# Patient Record
Sex: MALE | Race: ASIAN
[De-identification: names, ages, dates, MRNs, and addresses within clinical notes are randomized per-mention and may not be internally consistent; named-entity substitution may affect disease eponyms.]

---

## 2021-02-17 ENCOUNTER — HOSPITAL ENCOUNTER (INPATIENT)
Dept: HOSPITAL 27 - B2S | Age: 26
LOS: 4 days | Discharge: HOME | DRG: 885 | End: 2021-02-21
Attending: PSYCHIATRY & NEUROLOGY | Admitting: PSYCHIATRY & NEUROLOGY
Payer: COMMERCIAL

## 2021-02-17 VITALS — BODY MASS INDEX: 18.88 KG/M2 | HEIGHT: 66 IN | WEIGHT: 117.51 LBS

## 2021-02-17 VITALS — DIASTOLIC BLOOD PRESSURE: 65 MMHG | SYSTOLIC BLOOD PRESSURE: 111 MMHG
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
## 2021-02-17 DIAGNOSIS — F41.9: ICD-10-CM

## 2021-02-17 DIAGNOSIS — F31.9: Primary | ICD-10-CM

## 2021-02-17 DIAGNOSIS — Z28.21: ICD-10-CM

## 2021-02-17 DIAGNOSIS — G47.00: ICD-10-CM

## 2021-02-17 DIAGNOSIS — K59.00: ICD-10-CM

## 2021-02-17 DIAGNOSIS — T50.992A: ICD-10-CM

## 2021-02-17 PROCEDURE — 80307 DRUG TEST PRSMV CHEM ANLYZR: CPT

## 2021-02-17 PROCEDURE — 83036 HEMOGLOBIN GLYCOSYLATED A1C: CPT

## 2021-02-17 PROCEDURE — 84439 ASSAY OF FREE THYROXINE: CPT

## 2021-02-17 PROCEDURE — 84443 ASSAY THYROID STIM HORMONE: CPT

## 2021-02-17 PROCEDURE — 87081 CULTURE SCREEN ONLY: CPT

## 2021-02-18 VITALS — SYSTOLIC BLOOD PRESSURE: 124 MMHG | DIASTOLIC BLOOD PRESSURE: 68 MMHG

## 2021-02-18 VITALS — DIASTOLIC BLOOD PRESSURE: 72 MMHG | SYSTOLIC BLOOD PRESSURE: 124 MMHG

## 2021-02-18 VITALS — SYSTOLIC BLOOD PRESSURE: 124 MMHG | DIASTOLIC BLOOD PRESSURE: 72 MMHG

## 2021-02-18 VITALS — SYSTOLIC BLOOD PRESSURE: 107 MMHG | DIASTOLIC BLOOD PRESSURE: 64 MMHG

## 2021-02-18 LAB
ALBUMIN SERPL-MCNC: 4.3 G/DL (ref 3.4–5)
ALP SERPL-CCNC: 62 U/L (ref 46–116)
ALT SERPL-CCNC: 62 U/L (ref 12–78)
AMPHETAMINES UR QL SCN: NEGATIVE
ANION GAP SERPL CALCULATED.3IONS-SCNC: 7 MMOL/L (ref 8–16)
APPEARANCE UR: CLEAR
AST SERPL-CCNC: 21 U/L (ref 15–37)
BARBITURATES UR QL SCN: NEGATIVE
BASOPHILS # BLD AUTO: 0 K/UL (ref 0–0.2)
BASOPHILS NFR BLD AUTO: 0.7 % (ref 0–2)
BENZODIAZ UR QL SCN: NEGATIVE
BILIRUB SERPL-MCNC: 0.5 MG/DL (ref 0.1–1)
BILIRUB UR QL STRIP: NEGATIVE
BUN SERPL-MCNC: 17 MG/DL (ref 7–18)
BZE UR QL SCN: NEGATIVE
CALCIUM SERPL-MCNC: 10 MG/DL (ref 8.8–10.5)
CANNABINOIDS UR QL SCN: NEGATIVE
CHLORIDE SERPL-SCNC: 99 MMOL/L (ref 98–107)
CHOLEST SERPL-MCNC: 198 MG/DL (ref 131–200)
CHOLEST/HDLC SERPL: 6.2 {RATIO} (ref 4.2–7.3)
CO2 SERPL-SCNC: 31 MMOL/L (ref 22–29)
CREAT SERPL-MCNC: 0.95 MG/DL (ref 0.6–1.3)
EOSINOPHIL # BLD AUTO: 0.1 K/UL (ref 0–0.7)
EOSINOPHIL NFR BLD AUTO: 1.6 % (ref 1–6)
ERYTHROCYTE [DISTWIDTH] IN BLOOD BY AUTOMATED COUNT: 12.4 % (ref 11.5–14.5)
GFR SERPL CREATININE-BSD FRML MDRD: > 60 ML/MIN (ref 60–?)
GLUCOSE SERPL-MCNC: 81 MG/DL (ref 70–110)
GLUCOSE UR STRIP-MCNC: NEGATIVE MG/DL
HBA1C MFR BLD: 5.1 % (ref 3.8–5.6)
HCT VFR BLD AUTO: 43.1 % (ref 41–53)
HDLC SERPL-MCNC: 32 MG/DL (ref 40–60)
HGB BLD-MCNC: 14.7 G/DL (ref 13.5–17.5)
KETONES UR STRIP-MCNC: NEGATIVE MG/DL
LDLC SERPL CALC-MCNC: 150 MG/DL (ref 0–130)
LEUKOCYTE ESTERASE UR QL STRIP: NEGATIVE
LYMPHOCYTES # BLD AUTO: 1.1 K/UL (ref 1–4.8)
LYMPHOCYTES NFR BLD AUTO: 16.7 % (ref 22–44)
MCH RBC QN AUTO: 31.2 PG (ref 26–34)
MCHC RBC AUTO-ENTMCNC: 34.2 G/DL (ref 31–37)
MCV RBC AUTO: 91 FL (ref 80–100)
METHADONE UR QL SCN: NEGATIVE
MONOCYTES # BLD AUTO: 0.5 K/UL (ref 0.1–1)
MONOCYTES NFR BLD AUTO: 7.8 % (ref 2–9)
NEUTROPHILS # BLD AUTO: 4.7 K/UL (ref 1.8–7.7)
NEUTROPHILS NFR BLD AUTO: 73.2 % (ref 40–70)
NITRITE UR QL STRIP: NEGATIVE
OPIATES UR QL SCN: NEGATIVE
PCP UR QL SCN: NEGATIVE
PH UR STRIP: 6 [PH] (ref 5–8)
PLATELET # BLD AUTO: 363 K/UL (ref 150–450)
POTASSIUM SERPL-SCNC: 3.9 MMOL/L (ref 3.5–5.1)
PROT SERPL-MCNC: 8.3 G/DL (ref 6.4–8.2)
PROT UR QL STRIP: NEGATIVE
RBC # BLD AUTO: 4.73 MIL/UL (ref 4.5–5.9)
RBC UR QL: NEGATIVE
SODIUM SERPL-SCNC: 137 MMOL/L (ref 136–145)
SP GR UR STRIP: 1.02 (ref 1–1.02)
T4 FREE SERPL-MCNC: 1.21 NG/DL (ref 0.76–1.46)
TRIGL SERPL-MCNC: 81 MG/DL (ref 15–150)
TSH SERPL DL<=0.05 MIU/L-ACNC: 3.03 UIU/ML (ref 0.36–3.74)
UROBILINOGEN UR-MCNC: 0.2 MG/DL (ref ?–1)

## 2021-02-18 RX ADMIN — LITHIUM CARBONATE SCH MG: 300 CAPSULE ORAL at 16:24

## 2021-02-18 RX ADMIN — LITHIUM CARBONATE SCH MG: 300 CAPSULE ORAL at 09:35

## 2021-02-18 RX ADMIN — GABAPENTIN SCH MG: 100 CAPSULE ORAL at 20:17

## 2021-02-19 VITALS — DIASTOLIC BLOOD PRESSURE: 73 MMHG | SYSTOLIC BLOOD PRESSURE: 108 MMHG

## 2021-02-19 VITALS — DIASTOLIC BLOOD PRESSURE: 60 MMHG | SYSTOLIC BLOOD PRESSURE: 111 MMHG

## 2021-02-19 VITALS — SYSTOLIC BLOOD PRESSURE: 110 MMHG | DIASTOLIC BLOOD PRESSURE: 65 MMHG

## 2021-02-19 RX ADMIN — LITHIUM CARBONATE SCH MG: 300 CAPSULE ORAL at 08:18

## 2021-02-19 RX ADMIN — GABAPENTIN SCH MG: 100 CAPSULE ORAL at 12:25

## 2021-02-19 RX ADMIN — GABAPENTIN SCH MG: 100 CAPSULE ORAL at 16:50

## 2021-02-19 RX ADMIN — GABAPENTIN SCH MG: 100 CAPSULE ORAL at 08:18

## 2021-02-19 RX ADMIN — LITHIUM CARBONATE SCH MG: 300 CAPSULE ORAL at 16:50

## 2021-02-20 VITALS — DIASTOLIC BLOOD PRESSURE: 64 MMHG | SYSTOLIC BLOOD PRESSURE: 102 MMHG

## 2021-02-20 VITALS — DIASTOLIC BLOOD PRESSURE: 68 MMHG | SYSTOLIC BLOOD PRESSURE: 116 MMHG

## 2021-02-20 VITALS — DIASTOLIC BLOOD PRESSURE: 81 MMHG | SYSTOLIC BLOOD PRESSURE: 122 MMHG

## 2021-02-20 RX ADMIN — GABAPENTIN SCH MG: 100 CAPSULE ORAL at 16:13

## 2021-02-20 RX ADMIN — LITHIUM CARBONATE SCH MG: 300 CAPSULE ORAL at 08:13

## 2021-02-20 RX ADMIN — LITHIUM CARBONATE SCH MG: 300 CAPSULE ORAL at 16:13

## 2021-02-20 RX ADMIN — GABAPENTIN SCH MG: 100 CAPSULE ORAL at 08:13

## 2021-02-20 RX ADMIN — GABAPENTIN SCH MG: 100 CAPSULE ORAL at 12:21

## 2021-02-21 VITALS — SYSTOLIC BLOOD PRESSURE: 102 MMHG | DIASTOLIC BLOOD PRESSURE: 58 MMHG

## 2021-02-21 VITALS — DIASTOLIC BLOOD PRESSURE: 60 MMHG | SYSTOLIC BLOOD PRESSURE: 101 MMHG

## 2021-02-21 RX ADMIN — GABAPENTIN SCH MG: 100 CAPSULE ORAL at 08:08

## 2021-02-21 RX ADMIN — LITHIUM CARBONATE SCH MG: 300 CAPSULE ORAL at 08:08

## 2022-08-08 ENCOUNTER — APPOINTMENT (RX ONLY)
Dept: URBAN - METROPOLITAN AREA CLINIC 108 | Facility: CLINIC | Age: 27
Setting detail: DERMATOLOGY
End: 2022-08-08

## 2022-08-08 DIAGNOSIS — L73.0 ACNE KELOID: ICD-10-CM

## 2022-08-08 PROCEDURE — ? PRESCRIPTION MEDICATION MANAGEMENT

## 2022-08-08 PROCEDURE — ? INTRALESIONAL KENALOG

## 2022-08-08 PROCEDURE — ? PRESCRIPTION

## 2022-08-08 PROCEDURE — ? COUNSELING

## 2022-08-08 PROCEDURE — 11901 INJECT SKIN LESIONS >7: CPT

## 2022-08-08 PROCEDURE — ? FULL BODY SKIN EXAM - DECLINED

## 2022-08-08 RX ORDER — LIDOCAINE AND PRILOCAINE 25; 25 MG/G; MG/G
CREAM TOPICAL
Qty: 30 | Refills: 1 | Status: ERX | COMMUNITY
Start: 2022-08-08

## 2022-08-08 RX ORDER — CLINDAMYCIN PHOSPHATE 10 MG/ML
LOTION TOPICAL
Qty: 60 | Refills: 1 | Status: ERX | COMMUNITY
Start: 2022-08-08

## 2022-08-08 RX ADMIN — LIDOCAINE AND PRILOCAINE: 25; 25 CREAM TOPICAL at 00:00

## 2022-08-08 RX ADMIN — CLINDAMYCIN PHOSPHATE: 10 LOTION TOPICAL at 00:00

## 2022-08-08 ASSESSMENT — LOCATION DETAILED DESCRIPTION DERM
LOCATION DETAILED: LEFT INFERIOR OCCIPITAL SCALP
LOCATION DETAILED: MID-OCCIPITAL SCALP
LOCATION DETAILED: RIGHT OCCIPITAL SCALP
LOCATION DETAILED: LEFT OCCIPITAL SCALP
LOCATION DETAILED: RIGHT INFERIOR OCCIPITAL SCALP

## 2022-08-08 ASSESSMENT — LOCATION ZONE DERM: LOCATION ZONE: SCALP

## 2022-08-08 ASSESSMENT — LOCATION SIMPLE DESCRIPTION DERM: LOCATION SIMPLE: POSTERIOR SCALP

## 2022-08-08 NOTE — PROCEDURE: PRESCRIPTION MEDICATION MANAGEMENT
Render In Strict Bullet Format?: No
Initiate Treatment: Clindamycin lotion QAM \\nDuobrii QHS \\nKenalog injections q 4 weeks
Detail Level: Zone

## 2022-09-19 ENCOUNTER — APPOINTMENT (RX ONLY)
Dept: URBAN - METROPOLITAN AREA CLINIC 108 | Facility: CLINIC | Age: 27
Setting detail: DERMATOLOGY
End: 2022-09-19

## 2022-09-19 DIAGNOSIS — L73.0 ACNE KELOID: ICD-10-CM

## 2022-09-19 PROCEDURE — 11901 INJECT SKIN LESIONS >7: CPT

## 2022-09-19 PROCEDURE — ? INTRALESIONAL KENALOG

## 2022-09-19 PROCEDURE — ? PRESCRIPTION MEDICATION MANAGEMENT

## 2022-09-19 PROCEDURE — ? FULL BODY SKIN EXAM - DECLINED

## 2022-09-19 PROCEDURE — ? DIAGNOSIS COMMENT

## 2022-09-19 PROCEDURE — ? COUNSELING

## 2022-09-19 ASSESSMENT — LOCATION DETAILED DESCRIPTION DERM
LOCATION DETAILED: RIGHT OCCIPITAL SCALP
LOCATION DETAILED: LEFT INFERIOR OCCIPITAL SCALP
LOCATION DETAILED: RIGHT INFERIOR OCCIPITAL SCALP
LOCATION DETAILED: LEFT OCCIPITAL SCALP
LOCATION DETAILED: MID-OCCIPITAL SCALP

## 2022-09-19 ASSESSMENT — LOCATION ZONE DERM: LOCATION ZONE: SCALP

## 2022-09-19 ASSESSMENT — LOCATION SIMPLE DESCRIPTION DERM: LOCATION SIMPLE: POSTERIOR SCALP

## 2022-09-19 NOTE — PROCEDURE: PRESCRIPTION MEDICATION MANAGEMENT
Render In Strict Bullet Format?: No
Initiate Treatment: Continue Clindamycin lotion QAM \\nContinue Duobrii QHS \\nKenalog injections today , continue q 4 weeks
Detail Level: Zone

## 2022-09-19 NOTE — PROCEDURE: DIAGNOSIS COMMENT
Comment: Pt reports softening in areas previously treated s/p ILK last visit
Detail Level: Simple
Render Risk Assessment In Note?: no